# Patient Record
Sex: FEMALE | Race: OTHER | HISPANIC OR LATINO
[De-identification: names, ages, dates, MRNs, and addresses within clinical notes are randomized per-mention and may not be internally consistent; named-entity substitution may affect disease eponyms.]

---

## 2017-08-31 ENCOUNTER — TRANSCRIPTION ENCOUNTER (OUTPATIENT)
Age: 37
End: 2017-08-31

## 2017-10-18 ENCOUNTER — TRANSCRIPTION ENCOUNTER (OUTPATIENT)
Age: 37
End: 2017-10-18

## 2019-05-30 PROBLEM — Z00.00 ENCOUNTER FOR PREVENTIVE HEALTH EXAMINATION: Status: ACTIVE | Noted: 2019-05-30

## 2019-07-16 ENCOUNTER — APPOINTMENT (OUTPATIENT)
Dept: OBGYN | Facility: CLINIC | Age: 39
End: 2019-07-16
Payer: COMMERCIAL

## 2019-07-16 VITALS
HEIGHT: 61 IN | SYSTOLIC BLOOD PRESSURE: 100 MMHG | WEIGHT: 140 LBS | DIASTOLIC BLOOD PRESSURE: 60 MMHG | BODY MASS INDEX: 26.43 KG/M2

## 2019-07-16 DIAGNOSIS — Z31.69 ENCOUNTER FOR OTHER GENERAL COUNSELING AND ADVICE ON PROCREATION: ICD-10-CM

## 2019-07-16 DIAGNOSIS — Z86.018 PERSONAL HISTORY OF OTHER BENIGN NEOPLASM: ICD-10-CM

## 2019-07-16 PROCEDURE — 99202 OFFICE O/P NEW SF 15 MIN: CPT

## 2019-07-16 PROCEDURE — 36415 COLL VENOUS BLD VENIPUNCTURE: CPT

## 2019-07-16 NOTE — HISTORY OF PRESENT ILLNESS
[6 Months Ago] : 6 months ago [Good] : being in good health [Healthy Diet] : a healthy diet [Regular Exercise] : regular exercise [Last Pap ___] : Last cervical pap smear was [unfilled] [Reproductive Age] : is of reproductive age [Normal Amount/Duration] : was of a normal amount and duration [Regular Cycle Intervals] : periods have been regular [Frequency: Q ___ days] : menstrual periods occur approximately every [unfilled] days [Menarche Age: ____] : age at menarche was [unfilled] [Sexually Active] : is sexually active [Monogamous] : is monogamous [Male ___] : [unfilled] male [Weight Concerns] : no concerns with her weight [Spotting Between  Menses] : no spotting between menses [Menstrual Cramps] : no menstrual cramps

## 2019-07-17 LAB
ALBUMIN SERPL ELPH-MCNC: 4.6 G/DL
ALP BLD-CCNC: 39 U/L
ALT SERPL-CCNC: 5 U/L
ANION GAP SERPL CALC-SCNC: 12 MMOL/L
AST SERPL-CCNC: 14 U/L
BASOPHILS # BLD AUTO: 0.03 K/UL
BASOPHILS NFR BLD AUTO: 0.4 %
BILIRUB SERPL-MCNC: 0.2 MG/DL
BUN SERPL-MCNC: 20 MG/DL
CALCIUM SERPL-MCNC: 10.1 MG/DL
CHLORIDE SERPL-SCNC: 106 MMOL/L
CO2 SERPL-SCNC: 22 MMOL/L
CREAT SERPL-MCNC: 0.67 MG/DL
EOSINOPHIL # BLD AUTO: 0.18 K/UL
EOSINOPHIL NFR BLD AUTO: 2.4 %
GLUCOSE SERPL-MCNC: 110 MG/DL
HCT VFR BLD CALC: 41.7 %
HGB BLD-MCNC: 12.1 G/DL
IMM GRANULOCYTES NFR BLD AUTO: 0.3 %
LYMPHOCYTES # BLD AUTO: 2.43 K/UL
LYMPHOCYTES NFR BLD AUTO: 32.1 %
MAN DIFF?: NORMAL
MCHC RBC-ENTMCNC: 25.1 PG
MCHC RBC-ENTMCNC: 29 GM/DL
MCV RBC AUTO: 86.3 FL
MONOCYTES # BLD AUTO: 0.79 K/UL
MONOCYTES NFR BLD AUTO: 10.4 %
NEUTROPHILS # BLD AUTO: 4.13 K/UL
NEUTROPHILS NFR BLD AUTO: 54.4 %
PLATELET # BLD AUTO: 312 K/UL
POTASSIUM SERPL-SCNC: 4.9 MMOL/L
PROLACTIN SERPL-MCNC: 28.3 NG/ML
PROT SERPL-MCNC: 7.3 G/DL
RBC # BLD: 4.83 M/UL
RBC # FLD: 19.5 %
SODIUM SERPL-SCNC: 140 MMOL/L
T4 SERPL-MCNC: 6.2 UG/DL
TSH SERPL-ACNC: 1.6 UIU/ML
WBC # FLD AUTO: 7.58 K/UL

## 2019-07-22 LAB — ANTI-MUELLERIAN HORMONE: 1.96 NG/ML

## 2019-07-30 ENCOUNTER — APPOINTMENT (OUTPATIENT)
Dept: OBGYN | Facility: CLINIC | Age: 39
End: 2019-07-30
Payer: COMMERCIAL

## 2019-07-30 PROCEDURE — 36415 COLL VENOUS BLD VENIPUNCTURE: CPT

## 2019-07-31 DIAGNOSIS — E22.1 HYPERPROLACTINEMIA: ICD-10-CM

## 2019-07-31 LAB
ESTRADIOL SERPL-MCNC: 53 PG/ML
FSH SERPL-MCNC: 6.3 IU/L
LH SERPL-ACNC: 7.3 IU/L
PROLACTIN SERPL-MCNC: 27.9 NG/ML

## 2019-08-03 ENCOUNTER — APPOINTMENT (OUTPATIENT)
Dept: MRI IMAGING | Facility: CLINIC | Age: 39
End: 2019-08-03
Payer: COMMERCIAL

## 2019-08-03 ENCOUNTER — OUTPATIENT (OUTPATIENT)
Dept: OUTPATIENT SERVICES | Facility: HOSPITAL | Age: 39
LOS: 1 days | End: 2019-08-03

## 2019-08-03 PROCEDURE — 70553 MRI BRAIN STEM W/O & W/DYE: CPT | Mod: 26

## 2019-08-05 ENCOUNTER — OTHER (OUTPATIENT)
Age: 39
End: 2019-08-05

## 2019-09-24 ENCOUNTER — APPOINTMENT (OUTPATIENT)
Dept: ENDOCRINOLOGY | Facility: CLINIC | Age: 39
End: 2019-09-24

## 2019-11-26 ENCOUNTER — APPOINTMENT (OUTPATIENT)
Dept: OBGYN | Facility: CLINIC | Age: 39
End: 2019-11-26
Payer: COMMERCIAL

## 2019-11-26 PROCEDURE — 81025 URINE PREGNANCY TEST: CPT

## 2019-11-26 PROCEDURE — 99214 OFFICE O/P EST MOD 30 MIN: CPT

## 2019-11-26 PROCEDURE — 36415 COLL VENOUS BLD VENIPUNCTURE: CPT

## 2019-12-03 ENCOUNTER — APPOINTMENT (OUTPATIENT)
Dept: ANTEPARTUM | Facility: CLINIC | Age: 39
End: 2019-12-03
Payer: COMMERCIAL

## 2019-12-03 PROCEDURE — 0502F SUBSEQUENT PRENATAL CARE: CPT

## 2019-12-03 PROCEDURE — 76801 OB US < 14 WKS SINGLE FETUS: CPT

## 2019-12-10 ENCOUNTER — APPOINTMENT (OUTPATIENT)
Dept: OBGYN | Facility: CLINIC | Age: 39
End: 2019-12-10
Payer: COMMERCIAL

## 2019-12-10 PROCEDURE — 0502F SUBSEQUENT PRENATAL CARE: CPT

## 2019-12-23 ENCOUNTER — APPOINTMENT (OUTPATIENT)
Dept: OBGYN | Facility: CLINIC | Age: 39
End: 2019-12-23
Payer: COMMERCIAL

## 2019-12-23 PROCEDURE — 0502F SUBSEQUENT PRENATAL CARE: CPT

## 2019-12-30 ENCOUNTER — APPOINTMENT (OUTPATIENT)
Dept: OBGYN | Facility: CLINIC | Age: 39
End: 2019-12-30
Payer: COMMERCIAL

## 2019-12-30 PROCEDURE — 36415 COLL VENOUS BLD VENIPUNCTURE: CPT

## 2020-01-11 ENCOUNTER — APPOINTMENT (OUTPATIENT)
Dept: ANTEPARTUM | Facility: CLINIC | Age: 40
End: 2020-01-11
Payer: COMMERCIAL

## 2020-01-11 PROCEDURE — 78813 PET IMAGE FULL BODY: CPT

## 2020-02-06 ENCOUNTER — APPOINTMENT (OUTPATIENT)
Dept: OBGYN | Facility: CLINIC | Age: 40
End: 2020-02-06

## 2020-03-07 ENCOUNTER — APPOINTMENT (OUTPATIENT)
Dept: ANTEPARTUM | Facility: CLINIC | Age: 40
End: 2020-03-07
Payer: COMMERCIAL

## 2020-03-07 PROCEDURE — 76811 OB US DETAILED SNGL FETUS: CPT

## 2020-03-07 PROCEDURE — 76817 TRANSVAGINAL US OBSTETRIC: CPT

## 2020-04-04 ENCOUNTER — APPOINTMENT (OUTPATIENT)
Dept: OBGYN | Facility: CLINIC | Age: 40
End: 2020-04-04

## 2020-05-02 ENCOUNTER — APPOINTMENT (OUTPATIENT)
Dept: OBGYN | Facility: CLINIC | Age: 40
End: 2020-05-02

## 2020-05-08 ENCOUNTER — APPOINTMENT (OUTPATIENT)
Dept: OBGYN | Facility: CLINIC | Age: 40
End: 2020-05-08
Payer: COMMERCIAL

## 2020-05-08 PROCEDURE — 0502F SUBSEQUENT PRENATAL CARE: CPT

## 2020-05-09 ENCOUNTER — APPOINTMENT (OUTPATIENT)
Dept: ANTEPARTUM | Facility: CLINIC | Age: 40
End: 2020-05-09
Payer: COMMERCIAL

## 2020-05-09 PROCEDURE — 76819 FETAL BIOPHYS PROFIL W/O NST: CPT

## 2020-05-09 PROCEDURE — 76816 OB US FOLLOW-UP PER FETUS: CPT

## 2020-05-14 ENCOUNTER — APPOINTMENT (OUTPATIENT)
Dept: MRI IMAGING | Facility: IMAGING CENTER | Age: 40
End: 2020-05-14
Payer: COMMERCIAL

## 2020-05-14 ENCOUNTER — OUTPATIENT (OUTPATIENT)
Dept: OUTPATIENT SERVICES | Facility: HOSPITAL | Age: 40
LOS: 1 days | End: 2020-05-14
Payer: COMMERCIAL

## 2020-05-14 ENCOUNTER — RESULT REVIEW (OUTPATIENT)
Age: 40
End: 2020-05-14

## 2020-05-14 DIAGNOSIS — Z00.8 ENCOUNTER FOR OTHER GENERAL EXAMINATION: ICD-10-CM

## 2020-05-14 PROCEDURE — 72195 MRI PELVIS W/O DYE: CPT

## 2020-05-14 PROCEDURE — 72195 MRI PELVIS W/O DYE: CPT | Mod: 26

## 2020-05-16 ENCOUNTER — APPOINTMENT (OUTPATIENT)
Dept: OBGYN | Facility: CLINIC | Age: 40
End: 2020-05-16

## 2020-05-23 ENCOUNTER — APPOINTMENT (OUTPATIENT)
Dept: ANTEPARTUM | Facility: CLINIC | Age: 40
End: 2020-05-23

## 2020-06-13 ENCOUNTER — APPOINTMENT (OUTPATIENT)
Dept: ANTEPARTUM | Facility: CLINIC | Age: 40
End: 2020-06-13
Payer: COMMERCIAL

## 2020-06-13 PROCEDURE — 76819 FETAL BIOPHYS PROFIL W/O NST: CPT

## 2020-06-13 PROCEDURE — 76816 OB US FOLLOW-UP PER FETUS: CPT

## 2020-06-18 ENCOUNTER — APPOINTMENT (OUTPATIENT)
Dept: OBGYN | Facility: CLINIC | Age: 40
End: 2020-06-18
Payer: COMMERCIAL

## 2020-06-18 PROCEDURE — 0502F SUBSEQUENT PRENATAL CARE: CPT

## 2020-06-19 ENCOUNTER — OUTPATIENT (OUTPATIENT)
Dept: OUTPATIENT SERVICES | Facility: HOSPITAL | Age: 40
LOS: 1 days | End: 2020-06-19

## 2020-06-19 VITALS
HEART RATE: 84 BPM | WEIGHT: 186.07 LBS | HEIGHT: 61 IN | RESPIRATION RATE: 16 BRPM | OXYGEN SATURATION: 98 % | SYSTOLIC BLOOD PRESSURE: 122 MMHG | TEMPERATURE: 97 F | DIASTOLIC BLOOD PRESSURE: 80 MMHG

## 2020-06-19 DIAGNOSIS — O43.219 PLACENTA ACCRETA, UNSPECIFIED TRIMESTER: ICD-10-CM

## 2020-06-19 DIAGNOSIS — Z98.890 OTHER SPECIFIED POSTPROCEDURAL STATES: Chronic | ICD-10-CM

## 2020-06-19 LAB
APPEARANCE UR: CLEAR — SIGNIFICANT CHANGE UP
BILIRUB UR-MCNC: NEGATIVE — SIGNIFICANT CHANGE UP
BLD GP AB SCN SERPL QL: NEGATIVE — SIGNIFICANT CHANGE UP
BLOOD UR QL VISUAL: NEGATIVE — SIGNIFICANT CHANGE UP
COLOR SPEC: SIGNIFICANT CHANGE UP
GLUCOSE UR-MCNC: NEGATIVE — SIGNIFICANT CHANGE UP
HCT VFR BLD CALC: 41.8 % — SIGNIFICANT CHANGE UP (ref 34.5–45)
HGB BLD-MCNC: 13.9 G/DL — SIGNIFICANT CHANGE UP (ref 11.5–15.5)
KETONES UR-MCNC: HIGH
LEUKOCYTE ESTERASE UR-ACNC: NEGATIVE — SIGNIFICANT CHANGE UP
MCHC RBC-ENTMCNC: 30 PG — SIGNIFICANT CHANGE UP (ref 27–34)
MCHC RBC-ENTMCNC: 33.3 % — SIGNIFICANT CHANGE UP (ref 32–36)
MCV RBC AUTO: 90.1 FL — SIGNIFICANT CHANGE UP (ref 80–100)
NITRITE UR-MCNC: NEGATIVE — SIGNIFICANT CHANGE UP
NRBC # FLD: 0 K/UL — SIGNIFICANT CHANGE UP (ref 0–0)
PH UR: 6.5 — SIGNIFICANT CHANGE UP (ref 5–8)
PLATELET # BLD AUTO: 166 K/UL — SIGNIFICANT CHANGE UP (ref 150–400)
PMV BLD: 10.3 FL — SIGNIFICANT CHANGE UP (ref 7–13)
PROT UR-MCNC: NEGATIVE — SIGNIFICANT CHANGE UP
RBC # BLD: 4.64 M/UL — SIGNIFICANT CHANGE UP (ref 3.8–5.2)
RBC # FLD: 14.7 % — HIGH (ref 10.3–14.5)
RH IG SCN BLD-IMP: POSITIVE — SIGNIFICANT CHANGE UP
SP GR SPEC: 1.01 — SIGNIFICANT CHANGE UP (ref 1–1.04)
UROBILINOGEN FLD QL: NORMAL — SIGNIFICANT CHANGE UP
WBC # BLD: 11.03 K/UL — HIGH (ref 3.8–10.5)
WBC # FLD AUTO: 11.03 K/UL — HIGH (ref 3.8–10.5)

## 2020-06-19 NOTE — OB PST NOTE - ASSESSMENT
41 yo female presents to PST unit for scheduled primary  section-placenta accreta with Dr. Patel on 2020.

## 2020-06-19 NOTE — OB PST NOTE - NSMATERNALFETALCONCERNS_OBGYN_ALL_OB_FT
AMA  Placenta Accreta (active)   FIbroids  IUI pregnancy   First sono done 13/3/2019 confirms MEHREEN 7/23/2020

## 2020-06-19 NOTE — OB PST NOTE - PROBLEM SELECTOR PLAN 1
Scheduled primary  section-placenta accreta with Dr. Patel on 2020.  Verbal and written pre-op instructions provided to patient. Patient verbalized understanding and will call surgeons office for revised instructions if surgery is rescheduled.   patient given verbal and written instruction with teach back on chlorhexidine shampoo, and the patient verbalized understanding with return demonstration.   NPO after midnight.  Pending labs T&S, CBC, RPR, UA

## 2020-06-19 NOTE — OB PST NOTE - HISTORY OF PRESENT ILLNESS
41 yo female presents to PST unit for scheduled primary  section-placenta accreta with Dr. Patel on 2020. She reports positive fetal movement, denies any leaking fluid or vaginal bleeding.

## 2020-06-19 NOTE — OB PST NOTE - NSANTHOSAYNRD_GEN_A_CORE
No. JULIANNE screening performed.  STOP BANG Legend: 0-2 = LOW Risk; 3-4 = INTERMEDIATE Risk; 5-8 = HIGH Risk

## 2020-06-20 LAB — T PALLIDUM AB TITR SER: NEGATIVE — SIGNIFICANT CHANGE UP

## 2020-06-24 PROBLEM — O43.219 PLACENTA ACCRETA, UNSPECIFIED TRIMESTER: Chronic | Status: ACTIVE | Noted: 2020-06-19

## 2020-06-27 ENCOUNTER — APPOINTMENT (OUTPATIENT)
Dept: OBGYN | Facility: CLINIC | Age: 40
End: 2020-06-27
Payer: COMMERCIAL

## 2020-06-27 PROCEDURE — 76816 OB US FOLLOW-UP PER FETUS: CPT

## 2020-06-27 PROCEDURE — 76819 FETAL BIOPHYS PROFIL W/O NST: CPT

## 2020-07-04 ENCOUNTER — APPOINTMENT (OUTPATIENT)
Dept: DISASTER EMERGENCY | Facility: CLINIC | Age: 40
End: 2020-07-04

## 2020-07-04 DIAGNOSIS — Z01.818 ENCOUNTER FOR OTHER PREPROCEDURAL EXAMINATION: ICD-10-CM

## 2020-07-05 LAB — SARS-COV-2 N GENE NPH QL NAA+PROBE: NOT DETECTED

## 2020-07-06 ENCOUNTER — TRANSCRIPTION ENCOUNTER (OUTPATIENT)
Age: 40
End: 2020-07-06

## 2020-07-06 ENCOUNTER — APPOINTMENT (OUTPATIENT)
Dept: OBGYN | Facility: CLINIC | Age: 40
End: 2020-07-06
Payer: COMMERCIAL

## 2020-07-06 PROCEDURE — 0502F SUBSEQUENT PRENATAL CARE: CPT

## 2020-07-07 ENCOUNTER — INPATIENT (INPATIENT)
Facility: HOSPITAL | Age: 40
LOS: 2 days | Discharge: HOME | End: 2020-07-10
Attending: OBSTETRICS & GYNECOLOGY | Admitting: OBSTETRICS & GYNECOLOGY
Payer: COMMERCIAL

## 2020-07-07 VITALS — SYSTOLIC BLOOD PRESSURE: 113 MMHG | DIASTOLIC BLOOD PRESSURE: 71 MMHG | HEART RATE: 66 BPM

## 2020-07-07 DIAGNOSIS — Z98.890 OTHER SPECIFIED POSTPROCEDURAL STATES: Chronic | ICD-10-CM

## 2020-07-07 DIAGNOSIS — O43.219 PLACENTA ACCRETA, UNSPECIFIED TRIMESTER: ICD-10-CM

## 2020-07-07 LAB
BLD GP AB SCN SERPL QL: NEGATIVE — SIGNIFICANT CHANGE UP
HCT VFR BLD CALC: 34.2 % — LOW (ref 34.5–45)
HCT VFR BLD CALC: 37.5 % — SIGNIFICANT CHANGE UP (ref 34.5–45)
HCT VFR BLD CALC: 38.7 % — SIGNIFICANT CHANGE UP (ref 34.5–45)
HGB BLD-MCNC: 11.6 G/DL — SIGNIFICANT CHANGE UP (ref 11.5–15.5)
HGB BLD-MCNC: 12.7 G/DL — SIGNIFICANT CHANGE UP (ref 11.5–15.5)
HGB BLD-MCNC: 13.1 G/DL — SIGNIFICANT CHANGE UP (ref 11.5–15.5)
MCHC RBC-ENTMCNC: 30.2 PG — SIGNIFICANT CHANGE UP (ref 27–34)
MCHC RBC-ENTMCNC: 30.4 PG — SIGNIFICANT CHANGE UP (ref 27–34)
MCHC RBC-ENTMCNC: 30.9 PG — SIGNIFICANT CHANGE UP (ref 27–34)
MCHC RBC-ENTMCNC: 33.9 % — SIGNIFICANT CHANGE UP (ref 32–36)
MCV RBC AUTO: 89.2 FL — SIGNIFICANT CHANGE UP (ref 80–100)
MCV RBC AUTO: 89.7 FL — SIGNIFICANT CHANGE UP (ref 80–100)
MCV RBC AUTO: 91.2 FL — SIGNIFICANT CHANGE UP (ref 80–100)
NRBC # FLD: 0 K/UL — SIGNIFICANT CHANGE UP (ref 0–0)
NRBC # FLD: 0.02 K/UL — SIGNIFICANT CHANGE UP (ref 0–0)
NRBC # FLD: 0.03 K/UL — SIGNIFICANT CHANGE UP (ref 0–0)
PLATELET # BLD AUTO: 136 K/UL — LOW (ref 150–400)
PLATELET # BLD AUTO: 158 K/UL — SIGNIFICANT CHANGE UP (ref 150–400)
PLATELET # BLD AUTO: 163 K/UL — SIGNIFICANT CHANGE UP (ref 150–400)
PMV BLD: 11.1 FL — SIGNIFICANT CHANGE UP (ref 7–13)
PMV BLD: 11.2 FL — SIGNIFICANT CHANGE UP (ref 7–13)
PMV BLD: 12 FL — SIGNIFICANT CHANGE UP (ref 7–13)
RBC # BLD: 3.75 M/UL — LOW (ref 3.8–5.2)
RBC # BLD: 4.18 M/UL — SIGNIFICANT CHANGE UP (ref 3.8–5.2)
RBC # BLD: 4.34 M/UL — SIGNIFICANT CHANGE UP (ref 3.8–5.2)
RBC # FLD: 14.4 % — SIGNIFICANT CHANGE UP (ref 10.3–14.5)
RBC # FLD: 14.4 % — SIGNIFICANT CHANGE UP (ref 10.3–14.5)
RBC # FLD: 14.5 % — SIGNIFICANT CHANGE UP (ref 10.3–14.5)
RH IG SCN BLD-IMP: POSITIVE — SIGNIFICANT CHANGE UP
WBC # BLD: 10.67 K/UL — HIGH (ref 3.8–10.5)
WBC # BLD: 14.82 K/UL — HIGH (ref 3.8–10.5)
WBC # BLD: 15.04 K/UL — HIGH (ref 3.8–10.5)
WBC # FLD AUTO: 10.67 K/UL — HIGH (ref 3.8–10.5)
WBC # FLD AUTO: 14.82 K/UL — HIGH (ref 3.8–10.5)
WBC # FLD AUTO: 15.04 K/UL — HIGH (ref 3.8–10.5)

## 2020-07-07 PROCEDURE — 37244 VASC EMBOLIZE/OCCLUDE BLEED: CPT | Mod: 52

## 2020-07-07 PROCEDURE — 74018 RADEX ABDOMEN 1 VIEW: CPT | Mod: 26

## 2020-07-07 PROCEDURE — 59514 CESAREAN DELIVERY ONLY: CPT | Mod: U7

## 2020-07-07 RX ORDER — SODIUM CHLORIDE 9 MG/ML
1000 INJECTION, SOLUTION INTRAVENOUS ONCE
Refills: 0 | Status: COMPLETED | OUTPATIENT
Start: 2020-07-07 | End: 2020-07-07

## 2020-07-07 RX ORDER — FAMOTIDINE 10 MG/ML
20 INJECTION INTRAVENOUS ONCE
Refills: 0 | Status: COMPLETED | OUTPATIENT
Start: 2020-07-07 | End: 2020-07-07

## 2020-07-07 RX ORDER — OXYCODONE HYDROCHLORIDE 5 MG/1
10 TABLET ORAL
Refills: 0 | Status: DISCONTINUED | OUTPATIENT
Start: 2020-07-07 | End: 2020-07-08

## 2020-07-07 RX ORDER — OXYTOCIN 10 UNIT/ML
333.33 VIAL (ML) INJECTION
Qty: 20 | Refills: 0 | Status: DISCONTINUED | OUTPATIENT
Start: 2020-07-07 | End: 2020-07-08

## 2020-07-07 RX ORDER — METOCLOPRAMIDE HCL 10 MG
10 TABLET ORAL ONCE
Refills: 0 | Status: COMPLETED | OUTPATIENT
Start: 2020-07-07 | End: 2020-07-07

## 2020-07-07 RX ORDER — CITRIC ACID/SODIUM CITRATE 300-500 MG
30 SOLUTION, ORAL ORAL ONCE
Refills: 0 | Status: COMPLETED | OUTPATIENT
Start: 2020-07-07 | End: 2020-07-07

## 2020-07-07 RX ORDER — LANOLIN
1 OINTMENT (GRAM) TOPICAL EVERY 6 HOURS
Refills: 0 | Status: DISCONTINUED | OUTPATIENT
Start: 2020-07-07 | End: 2020-07-10

## 2020-07-07 RX ORDER — SODIUM CHLORIDE 9 MG/ML
1000 INJECTION, SOLUTION INTRAVENOUS
Refills: 0 | Status: DISCONTINUED | OUTPATIENT
Start: 2020-07-07 | End: 2020-07-07

## 2020-07-07 RX ORDER — SODIUM CHLORIDE 9 MG/ML
1000 INJECTION, SOLUTION INTRAVENOUS
Refills: 0 | Status: DISCONTINUED | OUTPATIENT
Start: 2020-07-07 | End: 2020-07-08

## 2020-07-07 RX ORDER — FOLIC ACID 0.8 MG
1 TABLET ORAL DAILY
Refills: 0 | Status: DISCONTINUED | OUTPATIENT
Start: 2020-07-07 | End: 2020-07-08

## 2020-07-07 RX ORDER — NALOXONE HYDROCHLORIDE 4 MG/.1ML
0.1 SPRAY NASAL
Refills: 0 | Status: DISCONTINUED | OUTPATIENT
Start: 2020-07-07 | End: 2020-07-08

## 2020-07-07 RX ORDER — IBUPROFEN 200 MG
600 TABLET ORAL EVERY 6 HOURS
Refills: 0 | Status: DISCONTINUED | OUTPATIENT
Start: 2020-07-07 | End: 2020-07-10

## 2020-07-07 RX ORDER — OXYCODONE HYDROCHLORIDE 5 MG/1
5 TABLET ORAL
Refills: 0 | Status: DISCONTINUED | OUTPATIENT
Start: 2020-07-07 | End: 2020-07-08

## 2020-07-07 RX ORDER — ONDANSETRON 8 MG/1
4 TABLET, FILM COATED ORAL EVERY 6 HOURS
Refills: 0 | Status: DISCONTINUED | OUTPATIENT
Start: 2020-07-07 | End: 2020-07-08

## 2020-07-07 RX ORDER — FERROUS SULFATE 325(65) MG
325 TABLET ORAL DAILY
Refills: 0 | Status: DISCONTINUED | OUTPATIENT
Start: 2020-07-07 | End: 2020-07-10

## 2020-07-07 RX ORDER — ONDANSETRON 8 MG/1
4 TABLET, FILM COATED ORAL ONCE
Refills: 0 | Status: COMPLETED | OUTPATIENT
Start: 2020-07-07 | End: 2020-07-07

## 2020-07-07 RX ORDER — OXYCODONE HYDROCHLORIDE 5 MG/1
5 TABLET ORAL ONCE
Refills: 0 | Status: DISCONTINUED | OUTPATIENT
Start: 2020-07-07 | End: 2020-07-10

## 2020-07-07 RX ORDER — TETANUS TOXOID, REDUCED DIPHTHERIA TOXOID AND ACELLULAR PERTUSSIS VACCINE, ADSORBED 5; 2.5; 8; 8; 2.5 [IU]/.5ML; [IU]/.5ML; UG/.5ML; UG/.5ML; UG/.5ML
0.5 SUSPENSION INTRAMUSCULAR ONCE
Refills: 0 | Status: DISCONTINUED | OUTPATIENT
Start: 2020-07-07 | End: 2020-07-10

## 2020-07-07 RX ORDER — OXYCODONE HYDROCHLORIDE 5 MG/1
5 TABLET ORAL
Refills: 0 | Status: COMPLETED | OUTPATIENT
Start: 2020-07-07 | End: 2020-07-14

## 2020-07-07 RX ORDER — ACETAMINOPHEN 500 MG
975 TABLET ORAL
Refills: 0 | Status: DISCONTINUED | OUTPATIENT
Start: 2020-07-07 | End: 2020-07-10

## 2020-07-07 RX ORDER — KETOROLAC TROMETHAMINE 30 MG/ML
30 SYRINGE (ML) INJECTION EVERY 6 HOURS
Refills: 0 | Status: DISCONTINUED | OUTPATIENT
Start: 2020-07-07 | End: 2020-07-07

## 2020-07-07 RX ORDER — HEPARIN SODIUM 5000 [USP'U]/ML
5000 INJECTION INTRAVENOUS; SUBCUTANEOUS EVERY 12 HOURS
Refills: 0 | Status: DISCONTINUED | OUTPATIENT
Start: 2020-07-07 | End: 2020-07-10

## 2020-07-07 RX ORDER — MAGNESIUM HYDROXIDE 400 MG/1
30 TABLET, CHEWABLE ORAL
Refills: 0 | Status: DISCONTINUED | OUTPATIENT
Start: 2020-07-07 | End: 2020-07-10

## 2020-07-07 RX ORDER — SIMETHICONE 80 MG/1
80 TABLET, CHEWABLE ORAL EVERY 4 HOURS
Refills: 0 | Status: DISCONTINUED | OUTPATIENT
Start: 2020-07-07 | End: 2020-07-10

## 2020-07-07 RX ORDER — HYDROMORPHONE HYDROCHLORIDE 2 MG/ML
1 INJECTION INTRAMUSCULAR; INTRAVENOUS; SUBCUTANEOUS
Refills: 0 | Status: DISCONTINUED | OUTPATIENT
Start: 2020-07-07 | End: 2020-07-08

## 2020-07-07 RX ORDER — DIPHENHYDRAMINE HCL 50 MG
25 CAPSULE ORAL EVERY 6 HOURS
Refills: 0 | Status: DISCONTINUED | OUTPATIENT
Start: 2020-07-07 | End: 2020-07-10

## 2020-07-07 RX ADMIN — HYDROMORPHONE HYDROCHLORIDE 1 MILLIGRAM(S): 2 INJECTION INTRAMUSCULAR; INTRAVENOUS; SUBCUTANEOUS at 11:10

## 2020-07-07 RX ADMIN — ONDANSETRON 4 MILLIGRAM(S): 8 TABLET, FILM COATED ORAL at 11:07

## 2020-07-07 RX ADMIN — FAMOTIDINE 20 MILLIGRAM(S): 10 INJECTION INTRAVENOUS at 06:24

## 2020-07-07 RX ADMIN — Medication 30 MILLILITER(S): at 06:24

## 2020-07-07 RX ADMIN — Medication 10 MILLIGRAM(S): at 06:25

## 2020-07-07 RX ADMIN — HYDROMORPHONE HYDROCHLORIDE 1 MILLIGRAM(S): 2 INJECTION INTRAMUSCULAR; INTRAVENOUS; SUBCUTANEOUS at 17:40

## 2020-07-07 RX ADMIN — Medication 10 MILLIGRAM(S): at 15:20

## 2020-07-07 RX ADMIN — Medication 975 MILLIGRAM(S): at 23:41

## 2020-07-07 RX ADMIN — ONDANSETRON 4 MILLIGRAM(S): 8 TABLET, FILM COATED ORAL at 23:41

## 2020-07-07 RX ADMIN — ONDANSETRON 4 MILLIGRAM(S): 8 TABLET, FILM COATED ORAL at 18:30

## 2020-07-07 RX ADMIN — SODIUM CHLORIDE 2000 MILLILITER(S): 9 INJECTION, SOLUTION INTRAVENOUS at 06:24

## 2020-07-07 RX ADMIN — SODIUM CHLORIDE 125 MILLILITER(S): 9 INJECTION, SOLUTION INTRAVENOUS at 06:24

## 2020-07-07 RX ADMIN — HEPARIN SODIUM 5000 UNIT(S): 5000 INJECTION INTRAVENOUS; SUBCUTANEOUS at 17:40

## 2020-07-07 NOTE — OB PROVIDER H&P - ASSESSMENT
Ms Madrigal is a 40yp  at 37w5d via IUI w/ PMH notable for fibroids s/p myomectomy in 2015 admitted for scheduled primary  section.

## 2020-07-07 NOTE — BRIEF OPERATIVE NOTE - OPERATION/FINDINGS
Right CFA accessed under ultrasound guidance, wire placed. Wire located within artery on crossectional ultrasound. REBOA catheter inserted to 26cm, x ray showing balloon just past aortic bifurcation ~L3 level. catheter sutured in place.   Baby delivered, placenta  without incidence. Sheath and REBOA removed, pressure held for 35min directly over arterial puncture site.  Femoral pulses 2+, DP and AT bilaterally we triphasic on duplex. Legs and feet warm with good capillary refill
- dense adhesion of bladder to mid-uterus  - irregular uterine body with signs of prior myomectomy  - irregular neovascularization across the anterior, left lateral, and posterior aspects of the uterus  - viable male infant, cephalic presentation, nuchal x1, Apgars 8/9  - Evicel applied at hysterotomy and site of prior adhesion on anterior uterus  - Nu-knit applied at hysterotomy and anterior uterus

## 2020-07-07 NOTE — OB NEONATOLOGY/PEDIATRICIAN DELIVERY SUMMARY - NSPEDSNEONOTESA_OBGYN_ALL_OB_FT
Baby boy born at 37.5 wks via CS to a 39 y/o  O+ blood type mother. Maternal history of myomectomy, IUI. Prenatal history of subchorionic hematoma resolved in first trimester and placenta accreta. PNL nr/immune/-, GBS - on . ROM at delivery clear fluids. Nuchal x1. Baby emerged vigorous, crying, was w/d/s/s with APGARS of 8/9. Mom would like to breast and bottle feed, requests Hep B and declines circ.

## 2020-07-07 NOTE — OB PROVIDER H&P - NSHPREVIEWOFSYSTEMS_GEN_ALL_CORE
CONSTITUTIONAL: No weakness, fevers or chills  EYES/ENT: No visual changes;  No vertigo or throat pain   NECK: No pain or stiffness  RESPIRATORY: No cough, wheezing, hemoptysis; No shortness of breath  CARDIOVASCULAR: No chest pain or palpitations  GASTROINTESTINAL: No abdominal or epigastric pain. (+) occasional nausea, vomiting, No diarrhea or constipation.   GENITOURINARY: No dysuria, frequency or hematuria  NEUROLOGICAL: No numbness or weakness  SKIN: No itching, burning, rashes, or lesions   All other review of systems is negative unless indicated above.

## 2020-07-07 NOTE — BRIEF OPERATIVE NOTE - NSICDXBRIEFPROCEDURE_GEN_ALL_CORE_FT
PROCEDURES:  Insertion, catheter, artery, percutaneous 07-Jul-2020 10:05:12  Dong Houser  Resuscitative endovascular balloon occlusion of aorta 07-Jul-2020 10:04:30  Dong Houser
PROCEDURES:  Lysis of adhesions, pelvic 2020 11:38:24  Nancy Vergara  Primary low transverse  section 2020 11:38:01  Nancy Vergara

## 2020-07-07 NOTE — OB RN PATIENT PROFILE - ALERT: PERTINENT HISTORY
1st Trimester Sonogram/Ultra Screen at 12 Weeks/Non Invasive Prenatal Screen (NIPS)/20 Week Level II Sonogram

## 2020-07-07 NOTE — OB PROVIDER DELIVERY SUMMARY - NSPROVIDERDELIVERYNOTE_OBGYN_ALL_OB_FT
Primary lower segment transverse  section done. Dense adhesuions of bladder with uterus  Placenta delivered spontaneosly.  REBOA sleeve was placed and  adhesions were divided.  Uterine incision was closed with caprosyn suture,  EBL 1500 ml. - Dense adhesion of bladder to mid-uterus  - Irregular uterine body with evidence of prior myomectomy  - Irregular neovascularization across the anterior, left lateral, and posterior aspects of the uterus  - Viable male infant, cephalic presentation, nuchal x1, Apgars 8/9  - Evicel applied at hysterotomy and site of prior adhesion on anterior uterus  - Nu-knit applied at hysterotomy and anterior uterus  - Placenta delivered spontaneosly.  - REBOA sleeve was placed and  adhesions were divided.  - Uterine incision was closed with caprosyn suture.  - EBL 1500 ml.

## 2020-07-07 NOTE — BRIEF OPERATIVE NOTE - NSICDXBRIEFPOSTOP_GEN_ALL_CORE_FT
POST-OP DIAGNOSIS:  Pregnancy with history of uterine myomectomy 07-Jul-2020 11:38:58  Nancy Vergara

## 2020-07-07 NOTE — PROGRESS NOTE ADULT - SUBJECTIVE AND OBJECTIVE BOX
Post Operative Note  Patient: BRITTANI BILLY 40y (1980) Female   MRN: [id number]  Location: [facility] [place] [address] [place]  Visit: 07-07-20 Inpatient  Date: 07-07-20 @ 15:19    Procedure: S/P     Subjective:   Denies pain. Mildly nauseous.  No chest pain, SOB, palpitations.     Objective:  Vitals: T(F): 98.7 (07-07-20 @ 14:50), Max: 99.1 (07-07-20 @ 10:50)  HR: 80 (07-07-20 @ 14:50)  BP: 126/74 (07-07-20 @ 14:50) (102/68 - 136/61)  RR: 20 (07-07-20 @ 14:50)  SpO2: 97% (07-07-20 @ 14:50)  Vent Settings:     In:   07-06-20 @ 07:01  -  07-07-20 @ 07:00  --------------------------------------------------------  IN: 2000 mL    07-07-20 @ 07:01  -  07-07-20 @ 15:19  --------------------------------------------------------  IN: 150 mL      IV Fluids: ferrous    sulfate 325 milliGRAM(s) Oral daily  folic acid 1 milliGRAM(s) Oral daily  lactated ringers. 1000 milliLiter(s) (75 mL/Hr) IV Continuous <Continuous>  prenatal multivitamin 1 Tablet(s) Oral daily      Out:   07-06-20 @ 07:01  -  07-07-20 @ 07:00  --------------------------------------------------------  OUT: 0 mL    07-07-20 @ 07:01  -  07-07-20 @ 15:19  --------------------------------------------------------  OUT: 150 mL      EBL: 10cc    Voided Urine:   07-06-20 @ 07:01  -  07-07-20 @ 07:00  --------------------------------------------------------  OUT: 0 mL    07-07-20 @ 07:01  -  07-07-20 @ 15:19  --------------------------------------------------------  OUT: 150 mL      Rodrigues Catheter: yes   Drains:         Physical Examination:  General: NAD, resting comfortably in bed  HEENT: Normocephalic atraumatic  Respiratory: Nonlabored respirations, normal CW expansion.  Cardio: S1S2, regular rate and rhythm.  Abdomen: softly distended, appropriately tender, surgical incisions are c/d/i. NGT/OGT*  Vascular: extremities are warm and well perfused.     Imaging:  No post-op imaging studies    Assessment:  40yFemale patient S/P *** for ***    Plan:  - IV Abx:   - Pain control PRN  - Diet:  - Activity:  - DVT ppx:    Date/Time: 07-07-20 @ 15:19 Post Operative Note  Patient: BRITTANI BILLY 40y (1980) Female   MRN: [id number]  Location: [facility] [place] [address] [place]  Visit: 07-07-20 Inpatient  Date: 07-07-20 @ 15:19    Procedure: S/P REBOA (resuscitative endovascular occlusion of aorta)    Subjective:   Denies pain. Mildly nauseous.  No chest pain, SOB, palpitations.     Objective:  Vitals: T(F): 98.7 (07-07-20 @ 14:50), Max: 99.1 (07-07-20 @ 10:50)  HR: 80 (07-07-20 @ 14:50)  BP: 126/74 (07-07-20 @ 14:50) (102/68 - 136/61)  RR: 20 (07-07-20 @ 14:50)  SpO2: 97% (07-07-20 @ 14:50)  Vent Settings:     In:   07-06-20 @ 07:01  -  07-07-20 @ 07:00  --------------------------------------------------------  IN: 2000 mL    07-07-20 @ 07:01  -  07-07-20 @ 15:19  --------------------------------------------------------  IN: 150 mL      IV Fluids: ferrous    sulfate 325 milliGRAM(s) Oral daily  folic acid 1 milliGRAM(s) Oral daily  lactated ringers. 1000 milliLiter(s) (75 mL/Hr) IV Continuous <Continuous>  prenatal multivitamin 1 Tablet(s) Oral daily      Out:   07-06-20 @ 07:01  -  07-07-20 @ 07:00  --------------------------------------------------------  OUT: 0 mL    07-07-20 @ 07:01  -  07-07-20 @ 15:19  --------------------------------------------------------  OUT: 150 mL      EBL: 10cc    Voided Urine:   07-06-20 @ 07:01  -  07-07-20 @ 07:00  --------------------------------------------------------  OUT: 0 mL    07-07-20 @ 07:01  -  07-07-20 @ 15:19  --------------------------------------------------------  OUT: 150 mL      Rodrigues Catheter: yes       Physical Examination:  General: NAD, resting comfortably in bed  HEENT: Normocephalic atraumatic  Respiratory: Nonlabored respirations, normal CW expansion.  Cardio: S1S2, regular rate and rhythm.  Abdomen: softly distended, appropriately tender, surgical incisions are c/d/i.   Vascular: 2+ femoral, 2+ DP and 2+PT pulses.     Imaging:  No post-op imaging studies    Assessment:  40yFemale patient S/P REBOA for placenta accreta    Plan:   [ ] Pain control PRN  [ ] LR @75cc/hr  [ ] Diet: regular  [ ] q4h vascular checks   [ ] DVT ppx: SQH    Herminio Hayes, PGY-1  [facility]   [id number]

## 2020-07-07 NOTE — OB PROVIDER H&P - NSHPPHYSICALEXAM_GEN_ALL_CORE
GEN: well appearing   HEENT: PEERLA, neck supple  CV: RRR, no murmurs  RESP: CTAB  Abd: Gravid, FHT present, cephalic presentation  EXT: bilateral LE edema 1+  Neuro: moves all extremities, all gross movements normal

## 2020-07-07 NOTE — BRIEF OPERATIVE NOTE - NSICDXBRIEFPREOP_GEN_ALL_CORE_FT
PRE-OP DIAGNOSIS:  Placenta accreta 07-Jul-2020 10:05:33  Dong Houser
PRE-OP DIAGNOSIS:  Pregnancy with history of uterine myomectomy 07-Jul-2020 11:39:34  Nancy Vergara

## 2020-07-07 NOTE — OB PROVIDER H&P - HISTORY OF PRESENT ILLNESS
Ms Madrigal is a 40yp  at 37w5d via IUI w/ PMH notable for fibroids s/p myomectomy in 2015 presenting for her scheduled primary  section.      complications   #Placenta Accreta  #Subchorionic hemorrhage (during first trimester, self resolved)

## 2020-07-08 LAB
BASOPHILS # BLD AUTO: 0.04 K/UL — SIGNIFICANT CHANGE UP (ref 0–0.2)
BASOPHILS NFR BLD AUTO: 0.3 % — SIGNIFICANT CHANGE UP (ref 0–2)
EOSINOPHIL # BLD AUTO: 0.01 K/UL — SIGNIFICANT CHANGE UP (ref 0–0.5)
EOSINOPHIL NFR BLD AUTO: 0.1 % — SIGNIFICANT CHANGE UP (ref 0–6)
HCT VFR BLD CALC: 32.8 % — LOW (ref 34.5–45)
HGB BLD-MCNC: 11 G/DL — LOW (ref 11.5–15.5)
IMM GRANULOCYTES NFR BLD AUTO: 0.8 % — SIGNIFICANT CHANGE UP (ref 0–1.5)
LYMPHOCYTES # BLD AUTO: 1.33 K/UL — SIGNIFICANT CHANGE UP (ref 1–3.3)
LYMPHOCYTES # BLD AUTO: 9.9 % — LOW (ref 13–44)
MCHC RBC-ENTMCNC: 30.7 PG — SIGNIFICANT CHANGE UP (ref 27–34)
MCHC RBC-ENTMCNC: 33.5 % — SIGNIFICANT CHANGE UP (ref 32–36)
MCV RBC AUTO: 91.6 FL — SIGNIFICANT CHANGE UP (ref 80–100)
MONOCYTES # BLD AUTO: 1.27 K/UL — HIGH (ref 0–0.9)
MONOCYTES NFR BLD AUTO: 9.4 % — SIGNIFICANT CHANGE UP (ref 2–14)
NEUTROPHILS # BLD AUTO: 10.68 K/UL — HIGH (ref 1.8–7.4)
NEUTROPHILS NFR BLD AUTO: 79.5 % — HIGH (ref 43–77)
NRBC # FLD: 0 K/UL — SIGNIFICANT CHANGE UP (ref 0–0)
PLATELET # BLD AUTO: 145 K/UL — LOW (ref 150–400)
PMV BLD: 11.4 FL — SIGNIFICANT CHANGE UP (ref 7–13)
RBC # BLD: 3.58 M/UL — LOW (ref 3.8–5.2)
RBC # FLD: 14.6 % — HIGH (ref 10.3–14.5)
WBC # BLD: 13.44 K/UL — HIGH (ref 3.8–10.5)
WBC # FLD AUTO: 13.44 K/UL — HIGH (ref 3.8–10.5)

## 2020-07-08 RX ORDER — SENNA PLUS 8.6 MG/1
1 TABLET ORAL
Refills: 0 | Status: DISCONTINUED | OUTPATIENT
Start: 2020-07-08 | End: 2020-07-10

## 2020-07-08 RX ORDER — OXYCODONE HYDROCHLORIDE 5 MG/1
5 TABLET ORAL
Refills: 0 | Status: DISCONTINUED | OUTPATIENT
Start: 2020-07-08 | End: 2020-07-10

## 2020-07-08 RX ADMIN — OXYCODONE HYDROCHLORIDE 5 MILLIGRAM(S): 5 TABLET ORAL at 12:02

## 2020-07-08 RX ADMIN — OXYCODONE HYDROCHLORIDE 5 MILLIGRAM(S): 5 TABLET ORAL at 16:07

## 2020-07-08 RX ADMIN — OXYCODONE HYDROCHLORIDE 5 MILLIGRAM(S): 5 TABLET ORAL at 18:30

## 2020-07-08 NOTE — PROGRESS NOTE ADULT - PROBLEM SELECTOR PLAN 1
- Continue with po analgesia  - Increase ambulation  - Continue regular diet  - d/c IV fluids  - Check CBC  - Incision dressing removed, abdominal pad placed on incision per pts request    Kiley Weaver, PGY1

## 2020-07-08 NOTE — PROGRESS NOTE ADULT - SUBJECTIVE AND OBJECTIVE BOX
SUBJECTIVE:   No acute events overnight. No chest pain, SOB.  Mild nausea, but no vomiting.     OBJECTIVE: T(C): 36.8 (07-08-20 @ 10:05), Max: 37.1 (07-07-20 @ 14:50)  HR: 81 (07-08-20 @ 10:05) (72 - 105)  BP: 120/66 (07-08-20 @ 10:05) (102/68 - 136/61)  RR: 16 (07-08-20 @ 06:00) (14 - 23)  SpO2: 96% (07-08-20 @ 10:05) (90% - 99%)  Wt(kg): --  I&O's Summary    07 Jul 2020 07:01  -  08 Jul 2020 07:00  --------------------------------------------------------  IN: 625 mL / OUT: 820 mL / NET: -195 mL      I&O's Detail    07 Jul 2020 07:01  -  08 Jul 2020 07:00  --------------------------------------------------------  IN:    lactated ringers.: 150 mL    oxytocin Infusion: 475 mL  Total IN: 625 mL    OUT:    Indwelling Catheter - Urethral: 370 mL    Voided: 450 mL  Total OUT: 820 mL    Total NET: -195 mL        General: NAD, appears comfortable  Respiratory: nonlabored breathing, normal chest wall expansion  Abdomen: soft, mildly tender, nondistended  Vascular: DP and PT 2+ bilaterally. no skin changes or erythema at the incision sites. no pulsatile masses.     MEDICATIONS  (STANDING):  acetaminophen   Tablet .. 975 milliGRAM(s) Oral <User Schedule>  diphtheria/tetanus/pertussis (acellular) Vaccine (ADAcel) 0.5 milliLiter(s) IntraMuscular once  ferrous    sulfate 325 milliGRAM(s) Oral daily  folic acid 1 milliGRAM(s) Oral daily  heparin   Injectable 5000 Unit(s) SubCutaneous every 12 hours  ibuprofen  Tablet. 600 milliGRAM(s) Oral every 6 hours  lactated ringers. 1000 milliLiter(s) (75 mL/Hr) IV Continuous <Continuous>  oxytocin Infusion 333.333 milliUNIT(s)/Min (1000 mL/Hr) IV Continuous <Continuous>  prenatal multivitamin 1 Tablet(s) Oral daily    MEDICATIONS  (PRN):  diphenhydrAMINE 25 milliGRAM(s) Oral every 6 hours PRN Itching  HYDROmorphone  Injectable 1 milliGRAM(s) IV Push every 3 hours PRN Severe Pain (7 - 10)  lanolin Ointment 1 Application(s) Topical every 6 hours PRN Sore Nipples  magnesium hydroxide Suspension 30 milliLiter(s) Oral two times a day PRN Constipation  naloxone Injectable 0.1 milliGRAM(s) IV Push every 3 minutes PRN For ANY of the following changes in patient status:  A. RR LESS THAN 10 breaths per minute, B. Oxygen saturation LESS THAN 90%, C. Sedation score of 6  ondansetron Injectable 4 milliGRAM(s) IV Push every 6 hours PRN Nausea  oxyCODONE    IR 5 milliGRAM(s) Oral every 3 hours PRN Mild Pain (1 - 3)  oxyCODONE    IR 10 milliGRAM(s) Oral every 3 hours PRN Moderate Pain (4 - 6)  oxyCODONE    IR 5 milliGRAM(s) Oral every 3 hours PRN Moderate to Severe Pain (4-10)  oxyCODONE    IR 5 milliGRAM(s) Oral once PRN Moderate to Severe Pain (4-10)  simethicone 80 milliGRAM(s) Chew every 4 hours PRN Gas      LABS:                        11.0   13.44 )-----------( 145      ( 08 Jul 2020 05:55 )             32.8       Assessment:   40yFemale POD#1 s/p REBOA for placenta accreta.    Plan:   [ ] Pain control PRN  [ ] Diet: regular  [ ] DVT ppx: SQH  [ ] Please page [facility] with any questions    Herminio Hayes, PGY-1  [facility]   [phone]

## 2020-07-08 NOTE — PROGRESS NOTE ADULT - SUBJECTIVE AND OBJECTIVE BOX
Anesthesia Pain Management Service    SUBJECTIVE: Patient s/p spinal morphine with pain managable and no problems.  Pain Scale Score:  Refer to charted pain scores    THERAPY:    s/p spinal PF morphine yesterday.      MEDICATIONS  (STANDING):  acetaminophen   Tablet .. 975 milliGRAM(s) Oral <User Schedule>  diphtheria/tetanus/pertussis (acellular) Vaccine (ADAcel) 0.5 milliLiter(s) IntraMuscular once  ferrous    sulfate 325 milliGRAM(s) Oral daily  folic acid 1 milliGRAM(s) Oral daily  heparin   Injectable 5000 Unit(s) SubCutaneous every 12 hours  ibuprofen  Tablet. 600 milliGRAM(s) Oral every 6 hours  lactated ringers. 1000 milliLiter(s) (75 mL/Hr) IV Continuous <Continuous>  oxytocin Infusion 333.333 milliUNIT(s)/Min (1000 mL/Hr) IV Continuous <Continuous>  prenatal multivitamin 1 Tablet(s) Oral daily    MEDICATIONS  (PRN):  diphenhydrAMINE 25 milliGRAM(s) Oral every 6 hours PRN Itching  HYDROmorphone  Injectable 1 milliGRAM(s) IV Push every 3 hours PRN Severe Pain (7 - 10)  lanolin Ointment 1 Application(s) Topical every 6 hours PRN Sore Nipples  magnesium hydroxide Suspension 30 milliLiter(s) Oral two times a day PRN Constipation  naloxone Injectable 0.1 milliGRAM(s) IV Push every 3 minutes PRN For ANY of the following changes in patient status:  A. RR LESS THAN 10 breaths per minute, B. Oxygen saturation LESS THAN 90%, C. Sedation score of 6  ondansetron Injectable 4 milliGRAM(s) IV Push every 6 hours PRN Nausea  oxyCODONE    IR 5 milliGRAM(s) Oral every 3 hours PRN Mild Pain (1 - 3)  oxyCODONE    IR 10 milliGRAM(s) Oral every 3 hours PRN Moderate Pain (4 - 6)  oxyCODONE    IR 5 milliGRAM(s) Oral every 3 hours PRN Moderate to Severe Pain (4-10)  oxyCODONE    IR 5 milliGRAM(s) Oral once PRN Moderate to Severe Pain (4-10)  simethicone 80 milliGRAM(s) Chew every 4 hours PRN Gas      OBJECTIVE: laying in bed     Sedation Score:	[ x] Alert	[ ] Drowsy	[ ] Arousable	[ ] Asleep	[ ] Unresponsive    Side Effects:	[ x] None	[ ] Nausea	[ ] Vomiting	[ ] Pruritus  		  [ ] Weakness		[ ] Numbness	[ ] Other:    Vital Signs Last 24 Hrs  T(C): 36.8 (08 Jul 2020 10:05), Max: 37.1 (07 Jul 2020 14:50)  T(F): 98.3 (08 Jul 2020 10:05), Max: 98.7 (07 Jul 2020 14:50)  HR: 81 (08 Jul 2020 10:05) (72 - 105)  BP: 120/66 (08 Jul 2020 10:05) (102/68 - 136/61)  BP(mean): 71 (07 Jul 2020 17:34) (70 - 98)  RR: 16 (08 Jul 2020 06:00) (14 - 23)  SpO2: 96% (08 Jul 2020 10:05) (90% - 99%)    ASSESSMENT/ PLAN  [x ] Patient transitioned to prn analgesics  [x] Pain management per primary service, pain service to sign off   [x]Documentation and Verification of current medications     Comments: PRN opioids or adjuvant medication to be given.    Progress Note written now but Patient was seen earlier.

## 2020-07-08 NOTE — PROGRESS NOTE ADULT - ASSESSMENT
41y/o  POD#1 from pLTCS for placenta accreta in stable condition. PMH significant for myomectomy in .

## 2020-07-08 NOTE — PROGRESS NOTE ADULT - SUBJECTIVE AND OBJECTIVE BOX
Patient seen and examined at bedside, no acute overnight events. No acute complaints, pain well controlled. Patient is ambulating. Patient c/o nausea and has not yet eaten. She has not yet passed flatus. Denies CP, SOB, N/V, HA, blurred vision, epigastric pain.    Vital Signs Last 24 Hours  T(C): 36.6 (07-08-20 @ 06:00), Max: 37.3 (07-07-20 @ 10:50)  HR: 74 (07-08-20 @ 06:00) (72 - 105)  BP: 126/65 (07-08-20 @ 06:00) (102/68 - 136/61)  RR: 16 (07-08-20 @ 06:00) (14 - 23)  SpO2: 96% (07-08-20 @ 06:00) (90% - 99%)    I&O's Summary    07 Jul 2020 07:01  -  08 Jul 2020 07:00  --------------------------------------------------------  IN: 625 mL / OUT: 820 mL / NET: -195 mL        Physical Exam:  General: NAD  Abdomen: Soft, non-tender, non-distended, fundus firm  Incision: Pfannenstiel incision CDI, steristrips in place  Pelvic: Lochia wnl, nonerythematous small right groin/femoral wound    Labs:    Blood Type: -- --  Antibody Screen: Negative  RPR: Negative               11.0   13.44 )-----------( 145      ( 07-08 @ 05:55 )             32.8                11.6   15.04 )-----------( 136      ( 07-07 @ 15:40 )             34.2                12.7   14.82 )-----------( 158      ( 07-07 @ 11:10 )             37.5         MEDICATIONS  (STANDING):  acetaminophen   Tablet .. 975 milliGRAM(s) Oral <User Schedule>  diphtheria/tetanus/pertussis (acellular) Vaccine (ADAcel) 0.5 milliLiter(s) IntraMuscular once  ferrous    sulfate 325 milliGRAM(s) Oral daily  folic acid 1 milliGRAM(s) Oral daily  heparin   Injectable 5000 Unit(s) SubCutaneous every 12 hours  ibuprofen  Tablet. 600 milliGRAM(s) Oral every 6 hours  lactated ringers. 1000 milliLiter(s) (75 mL/Hr) IV Continuous <Continuous>  oxytocin Infusion 333.333 milliUNIT(s)/Min (1000 mL/Hr) IV Continuous <Continuous>  prenatal multivitamin 1 Tablet(s) Oral daily    MEDICATIONS  (PRN):  diphenhydrAMINE 25 milliGRAM(s) Oral every 6 hours PRN Itching  HYDROmorphone  Injectable 1 milliGRAM(s) IV Push every 3 hours PRN Severe Pain (7 - 10)  lanolin Ointment 1 Application(s) Topical every 6 hours PRN Sore Nipples  magnesium hydroxide Suspension 30 milliLiter(s) Oral two times a day PRN Constipation  naloxone Injectable 0.1 milliGRAM(s) IV Push every 3 minutes PRN For ANY of the following changes in patient status:  A. RR LESS THAN 10 breaths per minute, B. Oxygen saturation LESS THAN 90%, C. Sedation score of 6  ondansetron Injectable 4 milliGRAM(s) IV Push every 6 hours PRN Nausea  oxyCODONE    IR 5 milliGRAM(s) Oral every 3 hours PRN Mild Pain (1 - 3)  oxyCODONE    IR 10 milliGRAM(s) Oral every 3 hours PRN Moderate Pain (4 - 6)  oxyCODONE    IR 5 milliGRAM(s) Oral every 3 hours PRN Moderate to Severe Pain (4-10)  oxyCODONE    IR 5 milliGRAM(s) Oral once PRN Moderate to Severe Pain (4-10)  simethicone 80 milliGRAM(s) Chew every 4 hours PRN Gas

## 2020-07-09 RX ORDER — ONDANSETRON 8 MG/1
4 TABLET, FILM COATED ORAL ONCE
Refills: 0 | Status: COMPLETED | OUTPATIENT
Start: 2020-07-09 | End: 2020-07-09

## 2020-07-09 RX ORDER — ACETAMINOPHEN 500 MG
3 TABLET ORAL
Qty: 0 | Refills: 0 | DISCHARGE
Start: 2020-07-09

## 2020-07-09 RX ORDER — IBUPROFEN 200 MG
1 TABLET ORAL
Qty: 0 | Refills: 0 | DISCHARGE
Start: 2020-07-09

## 2020-07-09 RX ADMIN — OXYCODONE HYDROCHLORIDE 5 MILLIGRAM(S): 5 TABLET ORAL at 06:08

## 2020-07-09 RX ADMIN — OXYCODONE HYDROCHLORIDE 5 MILLIGRAM(S): 5 TABLET ORAL at 00:06

## 2020-07-09 RX ADMIN — ONDANSETRON 4 MILLIGRAM(S): 8 TABLET, FILM COATED ORAL at 06:48

## 2020-07-09 RX ADMIN — OXYCODONE HYDROCHLORIDE 5 MILLIGRAM(S): 5 TABLET ORAL at 12:45

## 2020-07-09 NOTE — PROGRESS NOTE ADULT - SUBJECTIVE AND OBJECTIVE BOX
OB Attending Progress Note: TLCS, POD#2    S: 39yo POD#2 s/p LTCS. Her pain is well controlled. She is tolerating a clear diet and passing flatus. Voiding spontaneously. +Nausea, no vomiting. Denies CP/SOB/lightheadedness/dizziness. She is breastfeeding.    O:  Vitals:  Vital Signs Last 24 Hrs  T(C): 36.8 (09 Jul 2020 06:00), Max: 37.2 (08 Jul 2020 18:00)  T(F): 98.3 (09 Jul 2020 06:00), Max: 98.9 (08 Jul 2020 18:00)  HR: 85 (09 Jul 2020 06:00) (77 - 91)  BP: 123/65 (09 Jul 2020 06:00) (104/59 - 125/75)  BP(mean): --  RR: 16 (09 Jul 2020 06:00) (16 - 18)  SpO2: 98% (09 Jul 2020 06:00) (96% - 99%)    MEDICATIONS  (STANDING):  acetaminophen   Tablet .. 975 milliGRAM(s) Oral <User Schedule>  diphtheria/tetanus/pertussis (acellular) Vaccine (ADAcel) 0.5 milliLiter(s) IntraMuscular once  ferrous    sulfate 325 milliGRAM(s) Oral daily  heparin   Injectable 5000 Unit(s) SubCutaneous every 12 hours  ibuprofen  Tablet. 600 milliGRAM(s) Oral every 6 hours  prenatal multivitamin 1 Tablet(s) Oral daily      MEDICATIONS  (PRN):  diphenhydrAMINE 25 milliGRAM(s) Oral every 6 hours PRN Itching  lanolin Ointment 1 Application(s) Topical every 6 hours PRN Sore Nipples  magnesium hydroxide Suspension 30 milliLiter(s) Oral two times a day PRN Constipation  oxyCODONE    IR 5 milliGRAM(s) Oral once PRN Moderate to Severe Pain (4-10)  oxyCODONE    IR 5 milliGRAM(s) Oral every 3 hours PRN Moderate to Severe Pain (4-10)  senna 1 Tablet(s) Oral two times a day PRN Constipation  simethicone 80 milliGRAM(s) Chew every 4 hours PRN Gas      Labs:  Blood type: O Positive  Rubella IgG: RPR: Negative                          11.0<L>   13.44<H> >-----------< 145<L>    ( 07-08 @ 05:55 )             32.8<L>                        11.6   15.04<H> >-----------< 136<L>    ( 07-07 @ 15:40 )             34.2<L>                        12.7   14.82<H> >-----------< 158    ( 07-07 @ 11:10 )             37.5                        13.1   10.67<H> >-----------< 163    ( 07-07 @ 05:30 )             38.7                  PE:  General: NAD  CV: RR  Pulm: Breathing comfortably on RA  Abdomen: Soft, appropriately tender, incision c/d/i with steris.  Extremities: No erythema, no pitting edema    A/P: 39yo POD#2 s/p LTCS.  - Advance diet as tolerated.  - Increase ambulation.  - Continue motrin, tylenol, oxycodone PRN for pain control.  - Discharge planning tomorrow if GI status improved    Todd Alberts MD

## 2020-07-10 ENCOUNTER — TRANSCRIPTION ENCOUNTER (OUTPATIENT)
Age: 40
End: 2020-07-10

## 2020-07-10 VITALS
DIASTOLIC BLOOD PRESSURE: 57 MMHG | TEMPERATURE: 98 F | RESPIRATION RATE: 16 BRPM | SYSTOLIC BLOOD PRESSURE: 124 MMHG | OXYGEN SATURATION: 100 % | HEART RATE: 74 BPM

## 2020-07-10 NOTE — DISCHARGE NOTE OB - MATERIALS PROVIDED
Guide to Postpartum Care/[facility] Hearing Screen Program/Back To Sleep Handout/Birth Certificate Instructions/Tdap Vaccination (VIS Pub Date: 2012)/Vaccinations/Shaken Baby Prevention Handout/[facility]  Screening Program/  Immunization Record

## 2020-07-10 NOTE — DISCHARGE NOTE OB - CARE PLAN
Principal Discharge DX:	 delivery delivered  Goal:	return to baseline activities by 6weeks  Assessment and plan of treatment:	normal postpartum recovery

## 2020-07-10 NOTE — PROGRESS NOTE ADULT - SUBJECTIVE AND OBJECTIVE BOX
Patient assessed at 1030.  Subjective  Pain: Patient denies any pain at the time of assessment. Pain being managed well by pain management protocol.  Complaints: Patient denies any headache, blur vision, dizziness and/or weakness/fatigue. Patient reports passing flatus and denies any nausea/vomiting.   Infant feeding:   breastfeeding  Feeding related issues and/or concerns: none        Vitals  T(C): 36.8 (07-10-20 @ 10:06), Max: 37.1 (20 @ 17:36)  HR: 74 (07-10-20 @ 10:06) (66 - 88)  BP: 124/57 (07-10-20 @ 10:06) (119/69 - 131/60)  RR: 16 (07-10-20 @ 10:06) (16 - 18)  SpO2: 100% (07-10-20 @ 10:06) (98% - 100%)  Wt(kg): --      LABS:                11  13.44 )-------------( 145         ( 2020 05:55 )               32.8      Blood Type: O Positive    RPR: Negative    COVID-19 negative          MEDICATIONS  (STANDING):  acetaminophen   Tablet .. 975 milliGRAM(s) Oral <User Schedule>  diphtheria/tetanus/pertussis (acellular) Vaccine (ADAcel) 0.5 milliLiter(s) IntraMuscular once  ferrous    sulfate 325 milliGRAM(s) Oral daily  heparin   Injectable 5000 Unit(s) SubCutaneous every 12 hours  ibuprofen  Tablet. 600 milliGRAM(s) Oral every 6 hours  prenatal multivitamin 1 Tablet(s) Oral daily    MEDICATIONS  (PRN):  diphenhydrAMINE 25 milliGRAM(s) Oral every 6 hours PRN Itching  lanolin Ointment 1 Application(s) Topical every 6 hours PRN Sore Nipples  magnesium hydroxide Suspension 30 milliLiter(s) Oral two times a day PRN Constipation  oxyCODONE    IR 5 milliGRAM(s) Oral once PRN Moderate to Severe Pain (4-10)  oxyCODONE    IR 5 milliGRAM(s) Oral every 3 hours PRN Moderate to Severe Pain (4-10)  senna 1 Tablet(s) Oral two times a day PRN Constipation  simethicone 80 milliGRAM(s) Chew every 4 hours PRN Gas          39y/o     Day#3    primary post-operative  section delivery for myomectomy/ Placenta accreta with REBOA placed at delivery                              Condition: Stable  Past Medical/Surgical/OB/GYN History significant for: myomectomy 2015, fibroids, subchorionic hemorrhage during first trimester self resolving,  Current Issues: EBL 1500  Alert and orientedx3. Out of bed ambulating. Positive flatus. Negative bowel movement, denies urge. Voiding.  Tolerating a regular diet.  Lung sounds clear bilaterally.  Breasts: soft, nontender  Nipples: intact  Abdomen: Soft, nondistended and nontender. Bowel sounds present. Fundus firm  Abdominal incision: Clean, dry and intact with steri strips. Patient wearing abdominal binder for support.  Vaginal: Lochia light rubra  Extremities: Edema noted bilaterally and equal to lower extremities, nontender with no erythremic areas noted. Positive pedal pulses. No palpable veins noted  Other relevant physical exam findings: right groin site for REBOA placement WNL, no steri strips noted.           Plan  Continue routine post-operative and postpartum care  Increase ambulation, analgesia PRN and pain medication protocol of standing ibuprofen and acetaminophen and oxycodone prn  Encouraged to wear abdominal binder for support and to use incentive spirometer  Discussed abdominal incision care.  Discussed breast/nipple care and breastfeeding.  Discharge to home today. Discussed discharge planning.

## 2020-07-10 NOTE — DISCHARGE NOTE OB - HOSPITAL COURSE
As per delivery summary patient delivered primary  section for previous myomectomy with REBOA sleeve placed during delivery. As per delivery summary patient delivered on 20 @ 0907  primary  section for previous myomectomy with REBOA sleeve placed during delivery of baby male weighing 3160grams

## 2020-07-10 NOTE — DISCHARGE NOTE OB - CARE PROVIDER_API CALL
Jose Antonio Patel  MATERNAL/FETAL MEDICINE  [id number] [address] [place] [address]  Phone: [phone]  Fax: [phone]  Follow Up Time:

## 2020-07-10 NOTE — DISCHARGE NOTE OB - PATIENT PORTAL LINK FT
You can access the FollowMyHealth Patient Portal offered by [facility] by registering at the following website: [email or web address]. By joining [facility]’s FollowMyHealth portal, you will also be able to view your health information using other applications (apps) compatible with our system.

## 2020-07-20 ENCOUNTER — APPOINTMENT (OUTPATIENT)
Dept: OBGYN | Facility: CLINIC | Age: 40
End: 2020-07-20

## 2020-08-01 ENCOUNTER — APPOINTMENT (OUTPATIENT)
Dept: OBGYN | Facility: CLINIC | Age: 40
End: 2020-08-01
Payer: COMMERCIAL

## 2020-08-01 PROCEDURE — 0503F POSTPARTUM CARE VISIT: CPT

## 2020-08-13 PROBLEM — D21.9 BENIGN NEOPLASM OF CONNECTIVE AND OTHER SOFT TISSUE, UNSPECIFIED: Chronic | Status: ACTIVE | Noted: 2020-07-07

## 2020-08-29 ENCOUNTER — APPOINTMENT (OUTPATIENT)
Dept: OBGYN | Facility: CLINIC | Age: 40
End: 2020-08-29
Payer: COMMERCIAL

## 2020-08-29 PROCEDURE — 0503F POSTPARTUM CARE VISIT: CPT

## 2020-11-14 ENCOUNTER — APPOINTMENT (OUTPATIENT)
Dept: ANTEPARTUM | Facility: CLINIC | Age: 40
End: 2020-11-14
Payer: COMMERCIAL

## 2020-11-14 PROCEDURE — 99395 PREV VISIT EST AGE 18-39: CPT

## 2020-12-05 ENCOUNTER — APPOINTMENT (OUTPATIENT)
Dept: ANTEPARTUM | Facility: CLINIC | Age: 40
End: 2020-12-05

## 2023-02-27 NOTE — ASU PREOP CHECKLIST - PATIENT SENT TO
Ramana Haines,    Thank you for allowing [facility] to manage your care.    If you develop worsening/changing symptoms at any time, please call 911 or go to the emergency department for evaluation.    I ordered some xrays and CTs, please go to our radiology department to get your xrays.    See orthopedics in one week if your hand/wrist pain does not resolve. Call them on the number listed.    For your pain, please use Tylenol 650mg every 6 hours. You may use 400mg of ibuprofen between doses of Tylenol.     Max acetaminophen (Tylenol) 3,000mg/24 hours  Max ibuprofen 2,000mg/24 hours    If you have any questions or concerns, please feel free to call us at [phone]    Sincerely,    Farzad Dupree PA-C    Did you know?      You can schedule a video visit for follow-up appointments as well as future appointments for certain conditions.  Please see the below link.     [email or web address]    If you have not already done so,  I encourage you to sign up for [email or web address] ([email or web address]  This will allow you to review your results, securely communicate with a provider, and schedule virtual visits as well.    
operating room

## 2024-06-26 ENCOUNTER — NON-APPOINTMENT (OUTPATIENT)
Age: 44
End: 2024-06-26

## 2024-07-02 NOTE — OB PST NOTE - NSTRANFUSIONOBJECTION_GEN_ALL_CORE_SIUH
Detail Level: Detailed Detail Level: Generalized Detail Level: Simple Patient has no objection to blood transfusions.